# Patient Record
Sex: FEMALE | URBAN - METROPOLITAN AREA
[De-identification: names, ages, dates, MRNs, and addresses within clinical notes are randomized per-mention and may not be internally consistent; named-entity substitution may affect disease eponyms.]

---

## 2020-08-10 ENCOUNTER — APPOINTMENT (RX ONLY)
Dept: URBAN - METROPOLITAN AREA CLINIC 23 | Facility: CLINIC | Age: 85
Setting detail: DERMATOLOGY
End: 2020-08-10

## 2020-08-10 DIAGNOSIS — Z41.9 ENCOUNTER FOR PROCEDURE FOR PURPOSES OTHER THAN REMEDYING HEALTH STATE, UNSPECIFIED: ICD-10-CM

## 2020-08-10 PROCEDURE — ? BOTOX

## 2020-08-10 NOTE — PROCEDURE: MIPS QUALITY
Detail Level: Detailed
Additional Notes: Pneumonia vaccine not recommended by pcp
Quality 111:Pneumonia Vaccination Status For Older Adults: Pneumococcal Vaccination not Administered or Previously Received, Reason not Otherwise Specified
Quality 130: Documentation Of Current Medications In The Medical Record: Current Medications Documented

## 2020-08-10 NOTE — PROCEDURE: BOTOX
Additional Area 3 Location: high forehead 2 cm above brows
Show Masseter Units: Yes
Masseter Units: 0
Dilution (U/0.1 Cc): 2.5
Show Ucl Units: No
Nasal Root Units: 4
Periorbital Skin Units: [facility]
Expiration Date (Month Year): 4/2023
Price (Use Numbers Only, No Special Characters Or $): 0.00
Additional Area 5 Location: high forehead 1.5cm above brows
Consent: Written consent obtained. Risks include but not limited to lid/brow ptosis, bruising, swelling, diplopia, temporary effect, incomplete chemical denervation.
Glabellar Complex Units: 24
Additional Area 1 Location: lat brows
Additional Area 6 Location: [facility].
Lot #: [id number]
Detail Level: Zone
Additional Area 2 Location: chin
[Dysuria] : no dysuria
[Abdominal Pain] : no abdominal pain
[Fever] : no fever
[Hematuria] : no hematuria
[Frequency] : no frequency
[Skin Rash] : no skin rash
[Negative] : Psychiatric
[FreeTextEntry9] : left flank pain